# Patient Record
Sex: MALE | Race: WHITE | NOT HISPANIC OR LATINO | Employment: OTHER | URBAN - METROPOLITAN AREA
[De-identification: names, ages, dates, MRNs, and addresses within clinical notes are randomized per-mention and may not be internally consistent; named-entity substitution may affect disease eponyms.]

---

## 2020-09-23 ENCOUNTER — OFFICE VISIT (OUTPATIENT)
Dept: CARDIOTHORACIC SURGERY | Facility: CLINIC | Age: 65
End: 2020-09-23
Payer: MEDICARE

## 2020-09-23 DIAGNOSIS — Z09 FOLLOW-UP EXAMINATION, FOLLOWING OTHER SURGERY: Primary | ICD-10-CM

## 2020-09-23 DIAGNOSIS — I35.0 AORTIC VALVE STENOSIS, ETIOLOGY OF CARDIAC VALVE DISEASE UNSPECIFIED: ICD-10-CM

## 2020-09-23 PROCEDURE — 99024 PR POST-OP FOLLOW-UP VISIT: ICD-10-PCS | Mod: S$GLB,POP,, | Performed by: NURSE PRACTITIONER

## 2020-09-23 PROCEDURE — 99024 POSTOP FOLLOW-UP VISIT: CPT | Mod: S$GLB,POP,, | Performed by: NURSE PRACTITIONER

## 2020-09-24 VITALS — HEIGHT: 70 IN | BODY MASS INDEX: 37.68 KG/M2 | WEIGHT: 263.19 LBS

## 2020-09-24 PROBLEM — I35.0 AS (AORTIC STENOSIS): Status: ACTIVE | Noted: 2020-09-24

## 2020-09-24 RX ORDER — AMIODARONE HYDROCHLORIDE 200 MG/1
200 TABLET ORAL DAILY
COMMUNITY
Start: 2020-09-20

## 2020-09-24 RX ORDER — ASPIRIN 81 MG/1
81 TABLET ORAL DAILY
COMMUNITY
Start: 2020-09-20

## 2020-09-24 RX ORDER — FUROSEMIDE 80 MG/1
80 TABLET ORAL DAILY
COMMUNITY

## 2020-09-24 RX ORDER — TAMSULOSIN HYDROCHLORIDE 0.4 MG/1
1 CAPSULE ORAL DAILY
COMMUNITY
Start: 2020-09-20

## 2020-09-24 RX ORDER — FAMOTIDINE 20 MG/1
20 TABLET, FILM COATED ORAL 2 TIMES DAILY
COMMUNITY
Start: 2020-09-20

## 2020-09-24 RX ORDER — METOPROLOL TARTRATE 50 MG/1
50 TABLET ORAL 2 TIMES DAILY
COMMUNITY
Start: 2020-09-20

## 2020-09-24 NOTE — PROGRESS NOTES
Subjective:    Patient ID:  Josiah Wyatt is a 64 y.o. male who presents for follow up of sp avr surgery due to the presence of severe aortic stenosis; patient states he is doing well but has feelings of weakness and sob; Dr Sharpe recently increased his lasix from 20mg po daily to 80mg po daily; he is here in follow up with a chest xray.        Review of Systems   Constitution: Positive for malaise/fatigue. Negative for chills, decreased appetite, diaphoresis, fever, night sweats, weight gain and weight loss.   Cardiovascular: Positive for dyspnea on exertion and orthopnea. Negative for chest pain, claudication, irregular heartbeat, leg swelling, near-syncope, palpitations, paroxysmal nocturnal dyspnea and syncope.   Respiratory: Positive for shortness of breath. Negative for cough and sleep disturbances due to breathing.    Gastrointestinal: Negative for anorexia, constipation, diarrhea, nausea and vomiting.   Neurological: Negative for dizziness, light-headedness and weakness.        Objective:    Physical Exam   Constitutional: He is oriented to person, place, and time. He appears well-developed and well-nourished.   Weight continues to decrease since he has been at home; he has lost approx 8 pounds since his discharge.   Neck: No JVD present. Carotid bruit is not present. No edema present.   Cardiovascular: Normal rate, regular rhythm and normal heart sounds.   Pulmonary/Chest: Breath sounds normal. No respiratory distress.   Abdominal: Soft.   Musculoskeletal:         General: No edema.   Neurological: He is alert and oriented to person, place, and time.   Skin: Skin is dry.   Sternal incision is without redness or drainage.         Assessment:       1. Follow-up examination, following other surgery         Plan:       Josiah was seen today for valvular heart disease and shortness of breath.    Diagnoses and all orders for this visit:    Follow-up examination, following other surgery        Patient should  continue lasix 80mg as prescribed per dr dillard; will obtain a bmp to evalute bun cr and k; an US of right lower extremity will be obtained to evaluate for a DVT; patient to fu in one week to evaluate sob and weakness.

## 2020-10-28 ENCOUNTER — OFFICE VISIT (OUTPATIENT)
Dept: CARDIOTHORACIC SURGERY | Facility: CLINIC | Age: 65
End: 2020-10-28
Payer: MEDICARE

## 2020-10-28 VITALS
HEIGHT: 69 IN | BODY MASS INDEX: 35.7 KG/M2 | HEART RATE: 103 BPM | DIASTOLIC BLOOD PRESSURE: 81 MMHG | WEIGHT: 241 LBS | SYSTOLIC BLOOD PRESSURE: 135 MMHG

## 2020-10-28 DIAGNOSIS — Z09 FOLLOW-UP EXAMINATION, FOLLOWING OTHER SURGERY: Primary | ICD-10-CM

## 2020-10-28 PROCEDURE — 99024 POSTOP FOLLOW-UP VISIT: CPT | Mod: S$GLB,POP,, | Performed by: NURSE PRACTITIONER

## 2020-10-28 PROCEDURE — 99024 PR POST-OP FOLLOW-UP VISIT: ICD-10-PCS | Mod: S$GLB,POP,, | Performed by: NURSE PRACTITIONER

## 2020-10-28 RX ORDER — FUROSEMIDE 40 MG/1
40 TABLET ORAL DAILY
COMMUNITY

## 2020-10-28 NOTE — PROGRESS NOTES
"Subjective:    Patient ID:  Josiah Wyatt is a 65 y.o. male who presents for follow up of sp avr surgery; he states he has been doing well but reports ongoing pain that begins in his neck and radiates down his left arm; states is associated with shoulder pain also; he denies any chest pain or sob; wife is concerned that patient has "memory issues"; difficulty recognizing where they are at times.        Review of Systems   Constitution: Negative for chills, decreased appetite, diaphoresis, fever, malaise/fatigue, night sweats, weight gain and weight loss.   Eyes: Positive for blurred vision.   Cardiovascular: Negative for chest pain, claudication, dyspnea on exertion, irregular heartbeat, leg swelling, near-syncope, orthopnea, palpitations, paroxysmal nocturnal dyspnea and syncope.   Respiratory: Negative for cough, shortness of breath and sleep disturbances due to breathing.    Musculoskeletal: Positive for neck pain.   Gastrointestinal: Negative for anorexia, constipation, diarrhea, nausea and vomiting.   Neurological: Negative for dizziness, light-headedness and weakness.   Psychiatric/Behavioral: Positive for memory loss.        Objective:    Physical Exam   Constitutional: He is oriented to person, place, and time. He appears well-developed and well-nourished.   Neck: No JVD present. Carotid bruit is not present. No edema present.   Cardiovascular: Normal rate, regular rhythm and normal heart sounds.   Pulmonary/Chest: Breath sounds normal. No respiratory distress.   Abdominal: Soft.   Musculoskeletal:         General: No edema.   Neurological: He is alert and oriented to person, place, and time.   Skin: Skin is dry.   Sternal incision is without redness or drainage         Assessment:       1. Follow-up examination, following other surgery         Plan:       Josiah was seen today for valvular heart disease.    Diagnoses and all orders for this visit:    Follow-up examination, following other surgery        " Chest xray is free of effusion or infiltrate; patient will need a ct of head without contrast due to memory loss/blurred vision; patient will also need a referral to dr carreno in re: neck pain/numbness to left arm; ?spinal stenosis present.

## 2020-11-19 DIAGNOSIS — R41.3 MEMORY LOSS: ICD-10-CM

## 2020-11-19 DIAGNOSIS — H53.8 BLURRED VISION: Primary | ICD-10-CM

## 2020-11-20 DIAGNOSIS — I95.1 ORTHOSTATIC HYPOTENSION: Primary | ICD-10-CM

## 2020-12-02 DIAGNOSIS — R41.3 MEMORY LOSS: ICD-10-CM

## 2020-12-02 DIAGNOSIS — R53.83 FATIGUE, UNSPECIFIED TYPE: ICD-10-CM

## 2020-12-02 DIAGNOSIS — N42.9 PROSTATE DISORDER: ICD-10-CM

## 2020-12-02 DIAGNOSIS — I35.0 AORTIC VALVE STENOSIS, ETIOLOGY OF CARDIAC VALVE DISEASE UNSPECIFIED: Primary | ICD-10-CM

## 2020-12-07 ENCOUNTER — TELEPHONE (OUTPATIENT)
Dept: CARDIOTHORACIC SURGERY | Facility: CLINIC | Age: 65
End: 2020-12-07

## 2020-12-07 NOTE — TELEPHONE ENCOUNTER
Testosterone is within an acceptable range.Pt should see his cardiologist per Ryanne. Pt notified.  
158

## 2021-01-29 ENCOUNTER — TELEPHONE (OUTPATIENT)
Dept: CARDIOTHORACIC SURGERY | Facility: CLINIC | Age: 66
End: 2021-01-29

## 2022-09-19 ENCOUNTER — TELEPHONE (OUTPATIENT)
Dept: FAMILY MEDICINE | Facility: CLINIC | Age: 67
End: 2022-09-19
Payer: MEDICARE

## 2022-09-22 ENCOUNTER — TELEPHONE (OUTPATIENT)
Dept: FAMILY MEDICINE | Facility: CLINIC | Age: 67
End: 2022-09-22
Payer: MEDICARE

## 2022-09-29 ENCOUNTER — TELEPHONE (OUTPATIENT)
Dept: FAMILY MEDICINE | Facility: CLINIC | Age: 67
End: 2022-09-29
Payer: MEDICARE

## 2022-10-06 ENCOUNTER — OFFICE VISIT (OUTPATIENT)
Dept: INFECTIOUS DISEASES | Facility: CLINIC | Age: 67
End: 2022-10-06
Payer: MEDICARE

## 2022-10-06 VITALS — OXYGEN SATURATION: 97 % | HEART RATE: 70 BPM | SYSTOLIC BLOOD PRESSURE: 119 MMHG | DIASTOLIC BLOOD PRESSURE: 72 MMHG

## 2022-10-06 DIAGNOSIS — M46.26 ACUTE OSTEOMYELITIS OF LUMBAR SPINE: Primary | ICD-10-CM

## 2022-10-06 DIAGNOSIS — Z09 HOSPITAL DISCHARGE FOLLOW-UP: ICD-10-CM

## 2022-10-06 DIAGNOSIS — Z79.4 TYPE 2 DIABETES MELLITUS WITHOUT COMPLICATION, WITH LONG-TERM CURRENT USE OF INSULIN: ICD-10-CM

## 2022-10-06 DIAGNOSIS — Z45.2 PICC (PERIPHERALLY INSERTED CENTRAL CATHETER) REMOVAL: ICD-10-CM

## 2022-10-06 DIAGNOSIS — Z79.899 LONG TERM CURRENT USE OF THERAPEUTIC DRUG: ICD-10-CM

## 2022-10-06 DIAGNOSIS — E11.9 TYPE 2 DIABETES MELLITUS WITHOUT COMPLICATION, WITH LONG-TERM CURRENT USE OF INSULIN: ICD-10-CM

## 2022-10-06 PROCEDURE — 99214 PR OFFICE/OUTPT VISIT, EST, LEVL IV, 30-39 MIN: ICD-10-PCS | Mod: S$GLB,,, | Performed by: NURSE PRACTITIONER

## 2022-10-06 PROCEDURE — 99214 OFFICE O/P EST MOD 30 MIN: CPT | Mod: S$GLB,,, | Performed by: NURSE PRACTITIONER

## 2022-10-06 NOTE — PROGRESS NOTES
Infectious Diseases Clinic Note    Subjective:       Patient ID: Josiah Wyatt is a 67 y.o. male     Chief Complaint: Follow-up        A 66-year-old  male with past medical history of HTN, DM2, SALVADOR, Aortic stenosis status post TAVR, CAD status post PCI, discitis in the L4-L5 April 2022 for which he went to [facility] and was on vancomycin for 6 weeks as per his wife, he again developed symptoms and was sent to [facility] where he was started on daptomycin for another 6 weeks and discharged to nursing facility presented to [facility] for abnormal recent MRI follow up was done which was suspicious for osteomyelitis and abscess formation L4-L5 Patient with history of TAVR, had negative SUPA in 4/2022 and blood cultures are negative this time which is reassuring. MRI showed L4-5 osteomyelitis/discitis with enhancing soft tissue in the anterior epidural space posterior to the L4 and L5 vertebral bodies respectively. This is significantly improved when compared to the prior exam. NSGY recommends medical management. Dr Schwartz recommends daptomycin for 8-10 weeks. Patient also had chest wall abscess drained by surgery at bedside. Cultures show MRSA. TTE negative for endocarditis and no refractory bacteremia/fevers.        He was discharged home in stable condition on 9/13/2022 with home health services.  He is accompanied by his wife. Has follow up with Dr. Chadwick next week. Denies wounds.  He has been targeting osteomyelitis/MRSA chest wall abscess with daptomycin. Denies s/e from med. No missed doses. Has PICC line to e. No complications reported. Doing well at this time. Denies fever, chills, NVD.              Past Medical History:   Diagnosis Date    Diabetes mellitus     Sepsis, unspecified organism     Staphylococcus aureus bacteremia     Stenosis of aortic and mitral valves        Social History     Socioeconomic History    Marital status:    Tobacco Use    Smoking status: Never     Smokeless tobacco: Never         Current Outpatient Medications:     amiodarone (PACERONE) 200 MG Tab, Take 200 mg by mouth once daily., Disp: , Rfl:     aspirin (ECOTRIN) 81 MG EC tablet, Take 81 mg by mouth once daily., Disp: , Rfl:     famotidine (PEPCID) 20 MG tablet, Take 20 mg by mouth 2 (two) times daily., Disp: , Rfl:     furosemide (LASIX) 40 MG tablet, Take 40 mg by mouth once daily., Disp: , Rfl:     furosemide (LASIX) 80 MG tablet, Take 80 mg by mouth once daily., Disp: , Rfl:     metoprolol tartrate (LOPRESSOR) 50 MG tablet, Take 50 mg by mouth 2 (two) times daily., Disp: , Rfl:     tamsulosin (FLOMAX) 0.4 mg Cap, Take 1 capsule by mouth once daily., Disp: , Rfl:     Review of Systems   Constitutional:  Negative for appetite change, chills and fever.   Eyes:  Negative for visual disturbance.   Respiratory:  Negative for cough, chest tightness, shortness of breath and wheezing.    Cardiovascular:  Negative for chest pain.   Gastrointestinal:  Negative for abdominal pain, diarrhea, nausea and vomiting.   Endocrine: Negative for polydipsia, polyphagia and polyuria.   Genitourinary:  Negative for difficulty urinating, dysuria, flank pain and hematuria.   Musculoskeletal:  Negative for back pain and neck pain.   Skin:  Negative for rash.   Neurological:  Negative for dizziness, tremors and headaches.   Hematological:  Negative for adenopathy. Does not bruise/bleed easily.   Psychiatric/Behavioral:  Negative for confusion, dysphoric mood, hallucinations, sleep disturbance and suicidal ideas.          Objective:      Vitals:    10/06/22 1507   BP: 119/72   Pulse: 70     Physical Exam  Vitals and nursing note reviewed.   Constitutional:       General: He is awake. He is not in acute distress.     Appearance: Normal appearance. He is well-groomed. He is not ill-appearing, toxic-appearing or diaphoretic.       HENT:      Head: Normocephalic.   Eyes:      Pupils: Pupils are equal, round, and reactive to light.    Cardiovascular:      Rate and Rhythm: Normal rate.   Pulmonary:      Effort: Pulmonary effort is normal.   Abdominal:      General: Abdomen is flat.   Musculoskeletal:         General: Normal range of motion.      Cervical back: Normal range of motion.      Comments: PICC RUE. No s/s infection. NV intact    Skin:     General: Skin is warm and dry.   Neurological:      Mental Status: He is alert and oriented to person, place, and time. Mental status is at baseline.   Psychiatric:         Mood and Affect: Mood normal.         Behavior: Behavior normal. Behavior is cooperative.         Thought Content: Thought content normal.         Judgment: Judgment normal.           Assessment/Plan:       1. Acute osteomyelitis of lumbar spine    2. Long term current use of therapeutic drug    3. Type 2 diabetes mellitus without complication, with long-term current use of insulin    4. PICC (peripherally inserted central catheter) removal    5. Hospital discharge follow-up      Problem List Items Addressed This Visit          ID    Acute osteomyelitis of lumbar spine - Primary    Current Assessment & Plan     Currently with [facility].          1. Staphylococcus aureus                                         DAVION     INTERP    COST                                         ---------  -------  ------         Vancomycin                         1         S       1            Clindamycin                      0.25        S       2            Doxycycline                      <=0.5       S       2            Gentamicin                       <=0.5       S       2            Trimet/Sulfa                     <=10        S       2            Tetracycline                      <=1        S       3            Rifampin                         <=0.5       S       4            Oxacillin                         >=4        R       2            Erythromycin                      >=8        R       3            Inducible Clindamycin Resist                 NEG                   Cefoxitin Screen                            POS            ESR 40    CRP < 0.2    Case discussed w/ Dr. Amandeep Schwartz. Mild jump in ESR, however, significant decline in CRP.     PLAN    1. Continue current daptomycin as planned.   2. Weekly labs  3. RTC in 2 weeks.               Endocrine    Type 2 diabetes mellitus without complication, with long-term current use of insulin    Current Assessment & Plan     A1C unknown.       On basaglar and humalog.           Other Visit Diagnoses       Long term current use of therapeutic drug        PICC (peripherally inserted central catheter) removal        site clean and dry. maanaged by [facility].     Hospital discharge follow-up               No visits with results within 1 Month(s) from this visit.   Latest known visit with results is:   No results found for any previous visit.      No results found in the last 30 days.      Duration of encounter: 30 minutes  This includes face-to-face time and non face-to-face time preparing to see the patient (eg, review of tests), obtaining and/or reviewing separately obtained history, documenting clinical information in the electronic or other health record, independently interpreting resultsand communicating results to the patient/family/caregiver, or care coordination.      All diagnostic data (labs/imaging) was reviewed with the patient and/or family member in the room.  All questions were answered to their liking. The patient and/or family member voiced understanding of all instructions provided. Expectations regarding follow up and treatment plan were voiced and confirmed prior to departure. The patient was given orders/instructions at the end of the visit for reference. They were instructed to notify my office if they have not been contacted for imaging/referrals/labs/results in 1-2 weeks. They voiced understanding of all of the above.     Follow up:     There are no Patient Instructions on file for this  visit.     Follow up in about 2 weeks (around 10/20/2022), or if symptoms worsen or fail to improve.

## 2022-10-06 NOTE — ASSESSMENT & PLAN NOTE
Currently with [facility].          1. Staphylococcus aureus                                         DAVION     INTERP    COST                                         ---------  -------  ------         Vancomycin                         1         S       1            Clindamycin                      0.25        S       2            Doxycycline                      <=0.5       S       2            Gentamicin                       <=0.5       S       2            Trimet/Sulfa                     <=10        S       2            Tetracycline                      <=1        S       3            Rifampin                         <=0.5       S       4            Oxacillin                         >=4        R       2            Erythromycin                      >=8        R       3            Inducible Clindamycin Resist                NEG                   Cefoxitin Screen                            POS            ESR 40    CRP < 0.2    Case discussed w/ Dr. Amandeep Schwartz. Mild jump in ESR, however, significant decline in CRP.     PLAN    1. Continue current daptomycin as planned.   2. Weekly labs  3. RTC in 2 weeks.

## 2022-10-20 ENCOUNTER — OFFICE VISIT (OUTPATIENT)
Dept: INFECTIOUS DISEASES | Facility: CLINIC | Age: 67
End: 2022-10-20
Payer: MEDICARE

## 2022-10-20 VITALS — DIASTOLIC BLOOD PRESSURE: 69 MMHG | OXYGEN SATURATION: 97 % | SYSTOLIC BLOOD PRESSURE: 129 MMHG | HEART RATE: 81 BPM

## 2022-10-20 DIAGNOSIS — Z71.2 ENCOUNTER TO DISCUSS TEST RESULTS: ICD-10-CM

## 2022-10-20 DIAGNOSIS — Z79.899 LONG TERM CURRENT USE OF THERAPEUTIC DRUG: ICD-10-CM

## 2022-10-20 DIAGNOSIS — M54.9 BACK PAIN, UNSPECIFIED BACK LOCATION, UNSPECIFIED BACK PAIN LATERALITY, UNSPECIFIED CHRONICITY: ICD-10-CM

## 2022-10-20 DIAGNOSIS — M46.26 ACUTE OSTEOMYELITIS OF LUMBAR SPINE: Primary | ICD-10-CM

## 2022-10-20 PROCEDURE — 99213 PR OFFICE/OUTPT VISIT, EST, LEVL III, 20-29 MIN: ICD-10-PCS | Mod: S$GLB,,, | Performed by: NURSE PRACTITIONER

## 2022-10-20 PROCEDURE — 99213 OFFICE O/P EST LOW 20 MIN: CPT | Mod: S$GLB,,, | Performed by: NURSE PRACTITIONER

## 2022-10-20 RX ORDER — GABAPENTIN 100 MG/1
CAPSULE ORAL
COMMUNITY
Start: 2022-09-13

## 2022-10-20 RX ORDER — LEVOTHYROXINE SODIUM 100 UG/1
TABLET ORAL
COMMUNITY
Start: 2022-09-20

## 2022-10-20 RX ORDER — DULOXETIN HYDROCHLORIDE 60 MG/1
CAPSULE, DELAYED RELEASE ORAL
COMMUNITY
Start: 2022-09-20

## 2022-10-20 RX ORDER — ROSUVASTATIN CALCIUM 20 MG/1
TABLET, COATED ORAL
COMMUNITY
Start: 2022-09-20

## 2022-10-20 RX ORDER — MEMANTINE HYDROCHLORIDE 5 MG/1
TABLET ORAL
COMMUNITY
Start: 2022-10-14

## 2022-10-20 NOTE — PROGRESS NOTES
Infectious Diseases Clinic Note    Subjective:       Patient ID: Josiah Wyatt is a 67 y.o. male     Chief Complaint: 2WK FU (OSTEO)        A 66-year-old  male with past medical history of HTN, DM2, SALVADOR, Aortic stenosis status post TAVR, CAD status post PCI, discitis in the L4-L5 April 2022 for which he went to [facility] and was on vancomycin for 6 weeks as per his wife, he again developed symptoms and was sent to [facility] where he was started on daptomycin for another 6 weeks and discharged to nursing facility presented to [facility] for abnormal recent MRI follow up was done which was suspicious for osteomyelitis and abscess formation L4-L5 Patient with history of TAVR, had negative SUPA in 4/2022 and blood cultures are negative this time which is reassuring. MRI showed L4-5 osteomyelitis/discitis with enhancing soft tissue in the anterior epidural space posterior to the L4 and L5 vertebral bodies respectively. This is significantly improved when compared to the prior exam. NSGY recommends medical management. Dr Schwartz recommends daptomycin for 8-10 weeks. Patient also had chest wall abscess drained by surgery at bedside. Cultures show MRSA. TTE negative for endocarditis and no refractory bacteremia/fevers.        He was discharged home in stable condition on 9/13/2022 with home health services.          Here for TWO week follow up.  He is accompanied by his wife.  Denies wounds.  He has been targeting osteomyelitis/MRSA chest wall abscess with daptomycin. Denies s/e from med. No missed doses. Has PICC line to e. No complications reported. Doing well at this time. Denies fever, chills, NVD.       He saw Dr. Chadwick last week.  Apparently there was some discussion about repeating images after antibiotics are concluded.              Past Medical History:   Diagnosis Date    Diabetes mellitus     Sepsis, unspecified organism     Staphylococcus aureus bacteremia     Stenosis of aortic and  mitral valves        Social History     Socioeconomic History    Marital status:    Tobacco Use    Smoking status: Never    Smokeless tobacco: Never         Current Outpatient Medications:     amiodarone (PACERONE) 200 MG Tab, Take 200 mg by mouth once daily., Disp: , Rfl:     aspirin (ECOTRIN) 81 MG EC tablet, Take 81 mg by mouth once daily., Disp: , Rfl:     DULoxetine (CYMBALTA) 60 MG capsule, , Disp: , Rfl:     empagliflozin (JARDIANCE) 25 mg tablet, , Disp: , Rfl:     famotidine (PEPCID) 20 MG tablet, Take 20 mg by mouth 2 (two) times daily., Disp: , Rfl:     furosemide (LASIX) 40 MG tablet, Take 40 mg by mouth once daily., Disp: , Rfl:     furosemide (LASIX) 80 MG tablet, Take 80 mg by mouth once daily., Disp: , Rfl:     gabapentin (NEURONTIN) 100 MG capsule, , Disp: , Rfl:     levothyroxine (SYNTHROID) 100 MCG tablet, , Disp: , Rfl:     memantine (NAMENDA) 5 MG Tab, , Disp: , Rfl:     metoprolol tartrate (LOPRESSOR) 50 MG tablet, Take 50 mg by mouth 2 (two) times daily., Disp: , Rfl:     rosuvastatin (CRESTOR) 20 MG tablet, , Disp: , Rfl:     tamsulosin (FLOMAX) 0.4 mg Cap, Take 1 capsule by mouth once daily., Disp: , Rfl:     Review of Systems   Constitutional:  Negative for appetite change, chills and fever.   Eyes:  Negative for visual disturbance.   Respiratory:  Negative for cough, chest tightness, shortness of breath and wheezing.    Cardiovascular:  Negative for chest pain.   Gastrointestinal:  Negative for abdominal pain, diarrhea, nausea and vomiting.   Endocrine: Negative for polydipsia, polyphagia and polyuria.   Genitourinary:  Negative for difficulty urinating, dysuria, flank pain and hematuria.   Musculoskeletal:  Negative for back pain and neck pain.   Skin:  Negative for rash.   Neurological:  Negative for dizziness, tremors and headaches.   Hematological:  Negative for adenopathy. Does not bruise/bleed easily.   Psychiatric/Behavioral:  Negative for confusion, dysphoric mood,  hallucinations, sleep disturbance and suicidal ideas.          Objective:      Vitals:    10/20/22 1352   BP: 129/69   Pulse: 81     Physical Exam  Vitals and nursing note reviewed.   Constitutional:       General: He is awake. He is not in acute distress.     Appearance: Normal appearance. He is well-groomed. He is not ill-appearing, toxic-appearing or diaphoretic.       HENT:      Head: Normocephalic.   Eyes:      Pupils: Pupils are equal, round, and reactive to light.   Cardiovascular:      Rate and Rhythm: Normal rate.   Pulmonary:      Effort: Pulmonary effort is normal.   Abdominal:      General: Abdomen is flat.   Musculoskeletal:         General: Normal range of motion.      Cervical back: Normal range of motion.      Comments: PICC RUE. No s/s infection. NV intact    Skin:     General: Skin is warm and dry.   Neurological:      Mental Status: He is alert and oriented to person, place, and time. Mental status is at baseline.   Psychiatric:         Mood and Affect: Mood normal.         Behavior: Behavior normal. Behavior is cooperative.         Thought Content: Thought content normal.         Judgment: Judgment normal.           Assessment/Plan:       1. Acute osteomyelitis of lumbar spine    2. Encounter to discuss test results    3. Back pain, unspecified back location, unspecified back pain laterality, unspecified chronicity    4. Long term current use of therapeutic drug      Problem List Items Addressed This Visit          ID    Acute osteomyelitis of lumbar spine - Primary    Current Assessment & Plan      1. Staphylococcus aureus                                         DAVION     INTERP    COST                                         ---------  -------  ------         Vancomycin                         1         S       1            Clindamycin                      0.25        S       2            Doxycycline                      <=0.5       S       2            Gentamicin                       <=0.5        S       2            Trimet/Sulfa                     <=10        S       2            Tetracycline                      <=1        S       3            Rifampin                         <=0.5       S       4            Oxacillin                         >=4        R       2            Erythromycin                      >=8        R       3            Inducible Clindamycin Resist                NEG                   Cefoxitin Screen                            POS            Labs are improving.       ESR 34       PLAN     1. Continue current daptomycin as planned. expect another 3-4 weeks minimum.   2. Weekly labs  3. RTC in 2 weeks.           Other Visit Diagnoses       Encounter to discuss test results        Back pain, unspecified back location, unspecified back pain laterality, unspecified chronicity        Recommend discussing increasing gabapentin and muscle relaxer with PCP.     Long term current use of therapeutic drug               No visits with results within 1 Month(s) from this visit.   Latest known visit with results is:   No results found for any previous visit.      No results found in the last 30 days.      Duration of encounter: 20 minutes  This includes face-to-face time and non face-to-face time preparing to see the patient (eg, review of tests), obtaining and/or reviewing separately obtained history, documenting clinical information in the electronic or other health record, independently interpreting resultsand communicating results to the patient/family/caregiver, or care coordination.      All diagnostic data (labs/imaging) was reviewed with the patient and/or family member in the room.  All questions were answered to their liking. The patient and/or family member voiced understanding of all instructions provided. Expectations regarding follow up and treatment plan were voiced and confirmed prior to departure. The patient was given orders/instructions at the end of the visit for reference. They  were instructed to notify my office if they have not been contacted for imaging/referrals/labs/results in 1-2 weeks. They voiced understanding of all of the above.     Follow up:     There are no Patient Instructions on file for this visit.     Follow up in about 2 weeks (around 11/3/2022), or if symptoms worsen or fail to improve.

## 2022-10-20 NOTE — ASSESSMENT & PLAN NOTE
1. Staphylococcus aureus                                         DAVION     INTERP    COST                                         ---------  -------  ------         Vancomycin                         1         S       1            Clindamycin                      0.25        S       2            Doxycycline                      <=0.5       S       2            Gentamicin                       <=0.5       S       2            Trimet/Sulfa                     <=10        S       2            Tetracycline                      <=1        S       3            Rifampin                         <=0.5       S       4            Oxacillin                         >=4        R       2            Erythromycin                      >=8        R       3            Inducible Clindamycin Resist                NEG                   Cefoxitin Screen                            POS            10/18/22   ESR 34      Labs are improving.     10/24/2022  ESR 20       PLAN     1. Continue current daptomycin as planned. expect another 3-4 weeks minimum.   2. Weekly labs  3. RTC in 2 weeks.

## 2022-10-23 ENCOUNTER — DOCUMENT SCAN (OUTPATIENT)
Dept: HOME HEALTH SERVICES | Facility: HOSPITAL | Age: 67
End: 2022-10-23
Payer: MEDICARE

## 2022-10-31 ENCOUNTER — TELEPHONE (OUTPATIENT)
Dept: FAMILY MEDICINE | Facility: CLINIC | Age: 67
End: 2022-10-31
Payer: MEDICARE

## 2022-10-31 DIAGNOSIS — M46.26 ACUTE OSTEOMYELITIS OF LUMBAR SPINE: Primary | ICD-10-CM

## 2022-10-31 RX ORDER — DAPTOMYCIN 50 MG/ML
6 INJECTION, POWDER, LYOPHILIZED, FOR SOLUTION INTRAVENOUS DAILY
Qty: 36.68 EACH | Refills: 0 | Status: SHIPPED | OUTPATIENT
Start: 2022-10-31 | End: 2022-11-28

## 2022-10-31 NOTE — TELEPHONE ENCOUNTER
Per nam w/ [facility] infusion he's on dapto 600 q24 w/ 10/28 stop date originally. They will continue x28 days along w/ wkly labs.

## 2022-10-31 NOTE — TELEPHONE ENCOUNTER
----- Message from Shane Darden NP sent at 10/31/2022  9:35 AM CDT -----  Contact: Mrs Wyatt(wife)  Just tell them to match the current dose for another 28 days please....         ----- Message -----  From: Pennie Mejia MA  Sent: 10/31/2022   8:45 AM CDT  To: Shane Darden NP      ----- Message -----  From: Charmaine Cabello  Sent: 10/31/2022   8:39 AM CDT  To: [facility] Staff    Mrs Wyatt called to consult with nurse or staff regarding the patients antibiotics. She states he is out and is not sure if he needing to get a new prescription or wait until he sees the provider. She would like a call back and can be reached at [phone]. She request a voicemail if case she doesn't answer. Thanks/MR

## 2022-11-02 DIAGNOSIS — M46.26 ACUTE OSTEOMYELITIS OF LUMBAR SPINE: Primary | ICD-10-CM

## 2022-11-03 ENCOUNTER — OFFICE VISIT (OUTPATIENT)
Dept: INFECTIOUS DISEASES | Facility: CLINIC | Age: 67
End: 2022-11-03
Payer: MEDICARE

## 2022-11-03 VITALS
HEART RATE: 64 BPM | OXYGEN SATURATION: 98 % | DIASTOLIC BLOOD PRESSURE: 78 MMHG | TEMPERATURE: 98 F | SYSTOLIC BLOOD PRESSURE: 132 MMHG

## 2022-11-03 DIAGNOSIS — M54.9 BACK PAIN, UNSPECIFIED BACK LOCATION, UNSPECIFIED BACK PAIN LATERALITY, UNSPECIFIED CHRONICITY: ICD-10-CM

## 2022-11-03 DIAGNOSIS — M46.26 ACUTE OSTEOMYELITIS OF LUMBAR SPINE: Primary | ICD-10-CM

## 2022-11-03 PROCEDURE — 99213 PR OFFICE/OUTPT VISIT, EST, LEVL III, 20-29 MIN: ICD-10-PCS | Mod: S$GLB,,, | Performed by: NURSE PRACTITIONER

## 2022-11-03 PROCEDURE — 99213 OFFICE O/P EST LOW 20 MIN: CPT | Mod: S$GLB,,, | Performed by: NURSE PRACTITIONER

## 2022-11-03 RX ORDER — LEVOFLOXACIN 750 MG/1
750 TABLET ORAL DAILY
COMMUNITY
Start: 2022-11-01

## 2022-11-03 RX ORDER — CLOPIDOGREL BISULFATE 75 MG/1
75 TABLET ORAL DAILY
COMMUNITY
Start: 2022-09-20

## 2022-11-03 RX ORDER — TRAMADOL HYDROCHLORIDE 50 MG/1
50 TABLET ORAL EVERY 4 HOURS PRN
Qty: 30 TABLET | Refills: 0 | Status: SHIPPED | OUTPATIENT
Start: 2022-11-03

## 2022-11-03 RX ORDER — INSULIN GLARGINE 100 [IU]/ML
24 INJECTION, SOLUTION SUBCUTANEOUS DAILY
COMMUNITY
Start: 2022-09-22

## 2022-11-03 RX ORDER — PEN NEEDLE, DIABETIC 32GX 5/32"
NEEDLE, DISPOSABLE MISCELLANEOUS
COMMUNITY
Start: 2022-09-22

## 2022-11-03 RX ORDER — RISPERIDONE 0.5 MG/1
0.5 TABLET ORAL 2 TIMES DAILY
COMMUNITY
Start: 2022-10-14

## 2022-11-03 RX ORDER — EZETIMIBE 10 MG/1
10 TABLET ORAL DAILY
COMMUNITY
Start: 2022-10-20

## 2022-11-03 RX ORDER — MUPIROCIN 20 MG/G
22 OINTMENT TOPICAL DAILY
COMMUNITY
Start: 2022-08-26

## 2022-11-03 RX ORDER — INSULIN LISPRO 100 [IU]/ML
INJECTION, SOLUTION INTRAVENOUS; SUBCUTANEOUS
COMMUNITY
Start: 2022-08-13

## 2022-11-03 NOTE — PROGRESS NOTES
Infectious Diseases Clinic Note    Subjective:       Patient ID: Josiah Wyatt is a 67 y.o. male     Chief Complaint: No chief complaint on file.        A 66-year-old  male with past medical history of HTN, DM2, SALVADOR, Aortic stenosis status post TAVR, CAD status post PCI, discitis in the L4-L5 April 2022 for which he went to [facility] and was on vancomycin for 6 weeks as per his wife, he again developed symptoms and was sent to [facility] where he was started on daptomycin for another 6 weeks and discharged to nursing facility presented to [facility] for abnormal recent MRI follow up was done which was suspicious for osteomyelitis and abscess formation L4-L5 Patient with history of TAVR, had negative SUPA in 4/2022 and blood cultures are negative this time which is reassuring. MRI showed L4-5 osteomyelitis/discitis with enhancing soft tissue in the anterior epidural space posterior to the L4 and L5 vertebral bodies respectively. This is significantly improved when compared to the prior exam. NSGY recommends medical management. Dr Schwartz recommends daptomycin for 8-10 weeks. Patient also had chest wall abscess drained by surgery at bedside. Cultures show MRSA. TTE negative for endocarditis and no refractory bacteremia/fevers.        He was discharged home in stable condition on 9/13/2022 with home health services.          Here for TWO week follow up.  He is accompanied by his wife.  Denies wounds.  He has been targeting osteomyelitis/MRSA chest wall abscess with daptomycin. Denies s/e from med. No missed doses. Has PICC line to e. No complications reported. Doing well at this time. Denies fever, chills, NVD.       He saw Dr. Chadwick last week.  Apparently there was some discussion about repeating images after antibiotics are concluded.              Past Medical History:   Diagnosis Date    Diabetes mellitus     Sepsis, unspecified organism     Staphylococcus aureus bacteremia     Stenosis of  "aortic and mitral valves        Social History     Socioeconomic History    Marital status:    Tobacco Use    Smoking status: Never    Smokeless tobacco: Never         Current Outpatient Medications:     amiodarone (PACERONE) 200 MG Tab, Take 200 mg by mouth once daily., Disp: , Rfl:     aspirin (ECOTRIN) 81 MG EC tablet, Take 81 mg by mouth once daily., Disp: , Rfl:     BASAGLAR KWIKPEN U-100 INSULIN glargine 100 units/mL SubQ pen, Inject 24 Units into the skin once daily., Disp: , Rfl:     BD ANGEL 2ND GEN PEN NEEDLE 32 gauge x 5/32" Ndle, , Disp: , Rfl:     clopidogreL (PLAVIX) 75 mg tablet, Take 75 mg by mouth once daily., Disp: , Rfl:     DULoxetine (CYMBALTA) 60 MG capsule, , Disp: , Rfl:     empagliflozin (JARDIANCE) 25 mg tablet, , Disp: , Rfl:     ezetimibe (ZETIA) 10 mg tablet, Take 10 mg by mouth once daily., Disp: , Rfl:     famotidine (PEPCID) 20 MG tablet, Take 20 mg by mouth 2 (two) times daily., Disp: , Rfl:     furosemide (LASIX) 40 MG tablet, Take 40 mg by mouth once daily., Disp: , Rfl:     furosemide (LASIX) 80 MG tablet, Take 80 mg by mouth once daily., Disp: , Rfl:     gabapentin (NEURONTIN) 100 MG capsule, , Disp: , Rfl:     insulin lispro 100 unit/mL injection, INJECT PER SLIDING SCALE SUBCUTANEOUSLY NO MORE THAN 30 UNITS DAILY, Disp: , Rfl:     levoFLOXacin (LEVAQUIN) 750 MG tablet, Take 750 mg by mouth once daily., Disp: , Rfl:     levothyroxine (SYNTHROID) 100 MCG tablet, , Disp: , Rfl:     memantine (NAMENDA) 5 MG Tab, , Disp: , Rfl:     metoprolol tartrate (LOPRESSOR) 50 MG tablet, Take 50 mg by mouth 2 (two) times daily., Disp: , Rfl:     mupirocin (BACTROBAN) 2 % ointment, Apply 22 g topically once daily., Disp: , Rfl:     risperiDONE (RISPERDAL) 0.5 MG Tab, Take 0.5 mg by mouth 2 (two) times daily., Disp: , Rfl:     rosuvastatin (CRESTOR) 20 MG tablet, , Disp: , Rfl:     tamsulosin (FLOMAX) 0.4 mg Cap, Take 1 capsule by mouth once daily., Disp: , Rfl:     DAPTOmycin (CUBICIN) " 500 mg injection, Inject 655 mg into the vein Daily., Disp: 36.68 each, Rfl: 0    traMADoL (ULTRAM) 50 mg tablet, Take 1 tablet (50 mg total) by mouth every 4 (four) hours as needed for Pain., Disp: 30 tablet, Rfl: 0    Review of Systems   Constitutional:  Negative for appetite change, chills and fever.   Eyes:  Negative for visual disturbance.   Respiratory:  Negative for cough, chest tightness, shortness of breath and wheezing.    Cardiovascular:  Negative for chest pain.   Gastrointestinal:  Negative for abdominal pain, diarrhea, nausea and vomiting.   Endocrine: Negative for polydipsia, polyphagia and polyuria.   Genitourinary:  Negative for decreased urine volume, difficulty urinating, dysuria, flank pain, hematuria and urgency.   Musculoskeletal:  Positive for back pain. Negative for neck pain.   Skin:  Negative for rash.   Neurological:  Negative for dizziness, tremors and headaches.   Hematological:  Negative for adenopathy. Does not bruise/bleed easily.   Psychiatric/Behavioral:  Negative for confusion, dysphoric mood, hallucinations, sleep disturbance and suicidal ideas.          Objective:      Vitals:    11/03/22 1337   BP: 132/78   Pulse: 64   Temp: 98 °F (36.7 °C)     Physical Exam  Vitals and nursing note reviewed.   Constitutional:       General: He is awake. He is not in acute distress.     Appearance: Normal appearance. He is well-groomed. He is not ill-appearing, toxic-appearing or diaphoretic.       HENT:      Head: Normocephalic.   Eyes:      Pupils: Pupils are equal, round, and reactive to light.   Cardiovascular:      Rate and Rhythm: Normal rate.   Pulmonary:      Effort: Pulmonary effort is normal.   Abdominal:      General: Abdomen is flat.   Musculoskeletal:         General: Normal range of motion.      Cervical back: Normal range of motion.      Comments: PICC RUE. No s/s infection. NV intact    Skin:     General: Skin is warm and dry.   Neurological:      Mental Status: He is alert and  oriented to person, place, and time. Mental status is at baseline.   Psychiatric:         Mood and Affect: Mood normal.         Behavior: Behavior normal. Behavior is cooperative.         Thought Content: Thought content normal.         Judgment: Judgment normal.           Assessment/Plan:       1. Acute osteomyelitis of lumbar spine    2. Back pain, unspecified back location, unspecified back pain laterality, unspecified chronicity      Problem List Items Addressed This Visit          ID    Acute osteomyelitis of lumbar spine - Primary    Current Assessment & Plan       1. Staphylococcus aureus                                         DAVION     INTERP    COST                                         ---------  -------  ------         Vancomycin                         1         S       1            Clindamycin                      0.25        S       2            Doxycycline                      <=0.5       S       2            Gentamicin                       <=0.5       S       2            Trimet/Sulfa                     <=10        S       2            Tetracycline                      <=1        S       3            Rifampin                         <=0.5       S       4            Oxacillin                         >=4        R       2            Erythromycin                      >=8        R       3            Inducible Clindamycin Resist                NEG                   Cefoxitin Screen                            POS              Labs are improving.         10/31/2022  ESR 34        PLAN     1. Continue current daptomycin as planned.   2. Weekly labs  3. RTC in 2 weeks.          Relevant Medications    traMADoL (ULTRAM) 50 mg tablet     Other Visit Diagnoses       Back pain, unspecified back location, unspecified back pain laterality, unspecified chronicity        Will give him ultram. Recommend he f/u with PCP or neurosurgeon for pain mgmt.            No visits with results within 1 Month(s) from this  visit.   Latest known visit with results is:   No results found for any previous visit.      No results found in the last 30 days.      Duration of encounter: 20 minutes  This includes face-to-face time and non face-to-face time preparing to see the patient (eg, review of tests), obtaining and/or reviewing separately obtained history, documenting clinical information in the electronic or other health record, independently interpreting resultsand communicating results to the patient/family/caregiver, or care coordination.      All diagnostic data (labs/imaging) was reviewed with the patient and/or family member in the room.  All questions were answered to their liking. The patient and/or family member voiced understanding of all instructions provided. Expectations regarding follow up and treatment plan were voiced and confirmed prior to departure. The patient was given orders/instructions at the end of the visit for reference. They were instructed to notify my office if they have not been contacted for imaging/referrals/labs/results in 1-2 weeks. They voiced understanding of all of the above.     Follow up:     There are no Patient Instructions on file for this visit.     Follow up in about 2 weeks (around 11/17/2022), or if symptoms worsen or fail to improve.

## 2022-11-03 NOTE — ASSESSMENT & PLAN NOTE
1. Staphylococcus aureus                                         DAVION     INTERP    COST                                         ---------  -------  ------         Vancomycin                         1         S       1            Clindamycin                      0.25        S       2            Doxycycline                      <=0.5       S       2            Gentamicin                       <=0.5       S       2            Trimet/Sulfa                     <=10        S       2            Tetracycline                      <=1        S       3            Rifampin                         <=0.5       S       4            Oxacillin                         >=4        R       2            Erythromycin                      >=8        R       3            Inducible Clindamycin Resist                NEG                   Cefoxitin Screen                            POS              Labs are improving.         10/31/2022  ESR 34        PLAN     1. Continue current daptomycin as planned.   2. Weekly labs  3. RTC in 2 weeks.

## 2022-11-11 ENCOUNTER — TELEPHONE (OUTPATIENT)
Dept: FAMILY MEDICINE | Facility: CLINIC | Age: 67
End: 2022-11-11
Payer: MEDICARE

## 2022-11-14 ENCOUNTER — PATIENT MESSAGE (OUTPATIENT)
Dept: FAMILY MEDICINE | Facility: CLINIC | Age: 67
End: 2022-11-14
Payer: MEDICARE

## 2022-11-15 ENCOUNTER — TELEPHONE (OUTPATIENT)
Dept: FAMILY MEDICINE | Facility: CLINIC | Age: 67
End: 2022-11-15
Payer: MEDICARE

## 2022-11-15 DIAGNOSIS — T82.524A DISPLACEMENT OF PERIPHERALLY INSERTED CENTRAL CATHETER (PICC): Primary | ICD-10-CM

## 2022-11-17 ENCOUNTER — OFFICE VISIT (OUTPATIENT)
Dept: INFECTIOUS DISEASES | Facility: CLINIC | Age: 67
End: 2022-11-17
Payer: MEDICARE

## 2022-11-17 VITALS
DIASTOLIC BLOOD PRESSURE: 59 MMHG | TEMPERATURE: 98 F | HEART RATE: 70 BPM | OXYGEN SATURATION: 100 % | SYSTOLIC BLOOD PRESSURE: 130 MMHG

## 2022-11-17 DIAGNOSIS — T82.524A DISPLACEMENT OF PERIPHERALLY INSERTED CENTRAL CATHETER (PICC): ICD-10-CM

## 2022-11-17 DIAGNOSIS — M46.26 ACUTE OSTEOMYELITIS OF LUMBAR SPINE: Primary | ICD-10-CM

## 2022-11-17 PROCEDURE — 99214 OFFICE O/P EST MOD 30 MIN: CPT | Mod: S$GLB,,, | Performed by: NURSE PRACTITIONER

## 2022-11-17 PROCEDURE — 99214 PR OFFICE/OUTPT VISIT, EST, LEVL IV, 30-39 MIN: ICD-10-PCS | Mod: S$GLB,,, | Performed by: NURSE PRACTITIONER

## 2022-11-17 NOTE — PROGRESS NOTES
Infectious Diseases Clinic Note    Subjective:       Patient ID: Josiah Wyatt is a 67 y.o. male     Chief Complaint: Follow-up (2 week )        A 66-year-old  male with past medical history of HTN, DM2, SALVADOR, Aortic stenosis status post TAVR, CAD status post PCI, discitis in the L4-L5 April 2022 for which he went to [facility] and was on vancomycin for 6 weeks as per his wife, he again developed symptoms and was sent to [facility] where he was started on daptomycin for another 6 weeks and discharged to nursing facility presented to [facility] for abnormal recent MRI follow up was done which was suspicious for osteomyelitis and abscess formation L4-L5 Patient with history of TAVR, had negative SUPA in 4/2022 and blood cultures are negative this time which is reassuring. MRI showed L4-5 osteomyelitis/discitis with enhancing soft tissue in the anterior epidural space posterior to the L4 and L5 vertebral bodies respectively. This is significantly improved when compared to the prior exam. NSGY recommends medical management. Dr Schwartz recommends daptomycin for 8-10 weeks. Patient also had chest wall abscess drained by surgery at bedside. Cultures show MRSA. TTE negative for endocarditis and no refractory bacteremia/fevers.        He was discharged home in stable condition on 9/13/2022 with home health services.          Here for TWO week follow up.  He is accompanied by his wife.  Denies wounds.  He has been targeting osteomyelitis/MRSA chest wall abscess with daptomycin. Denies s/e from med. No missed doses. Has PICC line to e. PICC line has been pulled out slightly.  Orders were sent for exchange earlier this week but the patient deferred until he came in today.  Nocomplications reported otherwise. Med is still flushing without complications. Doing well at this time. Denies fever, chills, NVD. EOT estimated to be 11/22/2022.       He saw Dr. Chadwick last week.  MRI was done. Dr. Chadwick wants him  "to proceed w/ PT. They are not sure about infection, but there was no mention of this during the visit.     No new complaints at this time.              Past Medical History:   Diagnosis Date    Diabetes mellitus     Sepsis, unspecified organism     Staphylococcus aureus bacteremia     Stenosis of aortic and mitral valves        Social History     Socioeconomic History    Marital status:    Tobacco Use    Smoking status: Never    Smokeless tobacco: Never         Current Outpatient Medications:     amiodarone (PACERONE) 200 MG Tab, Take 200 mg by mouth once daily., Disp: , Rfl:     aspirin (ECOTRIN) 81 MG EC tablet, Take 81 mg by mouth once daily., Disp: , Rfl:     BASAGLAR KWIKPEN U-100 INSULIN glargine 100 units/mL SubQ pen, Inject 24 Units into the skin once daily., Disp: , Rfl:     BD ANGEL 2ND GEN PEN NEEDLE 32 gauge x 5/32" Ndle, , Disp: , Rfl:     clopidogreL (PLAVIX) 75 mg tablet, Take 75 mg by mouth once daily., Disp: , Rfl:     DAPTOmycin (CUBICIN) 500 mg injection, Inject 655 mg into the vein Daily., Disp: 36.68 each, Rfl: 0    DULoxetine (CYMBALTA) 60 MG capsule, , Disp: , Rfl:     empagliflozin (JARDIANCE) 25 mg tablet, , Disp: , Rfl:     ezetimibe (ZETIA) 10 mg tablet, Take 10 mg by mouth once daily., Disp: , Rfl:     famotidine (PEPCID) 20 MG tablet, Take 20 mg by mouth 2 (two) times daily., Disp: , Rfl:     furosemide (LASIX) 40 MG tablet, Take 40 mg by mouth once daily., Disp: , Rfl:     furosemide (LASIX) 80 MG tablet, Take 80 mg by mouth once daily., Disp: , Rfl:     gabapentin (NEURONTIN) 100 MG capsule, , Disp: , Rfl:     insulin lispro 100 unit/mL injection, INJECT PER SLIDING SCALE SUBCUTANEOUSLY NO MORE THAN 30 UNITS DAILY, Disp: , Rfl:     levoFLOXacin (LEVAQUIN) 750 MG tablet, Take 750 mg by mouth once daily., Disp: , Rfl:     levothyroxine (SYNTHROID) 100 MCG tablet, , Disp: , Rfl:     memantine (NAMENDA) 5 MG Tab, , Disp: , Rfl:     metoprolol tartrate (LOPRESSOR) 50 MG tablet, Take " 50 mg by mouth 2 (two) times daily., Disp: , Rfl:     mupirocin (BACTROBAN) 2 % ointment, Apply 22 g topically once daily., Disp: , Rfl:     risperiDONE (RISPERDAL) 0.5 MG Tab, Take 0.5 mg by mouth 2 (two) times daily., Disp: , Rfl:     rosuvastatin (CRESTOR) 20 MG tablet, , Disp: , Rfl:     tamsulosin (FLOMAX) 0.4 mg Cap, Take 1 capsule by mouth once daily., Disp: , Rfl:     traMADoL (ULTRAM) 50 mg tablet, Take 1 tablet (50 mg total) by mouth every 4 (four) hours as needed for Pain., Disp: 30 tablet, Rfl: 0    Review of Systems   Constitutional:  Negative for appetite change, chills and fever.   Eyes:  Negative for visual disturbance.   Respiratory:  Negative for cough, chest tightness, shortness of breath and wheezing.    Cardiovascular:  Negative for chest pain.   Gastrointestinal:  Negative for abdominal pain, diarrhea, nausea and vomiting.   Endocrine: Negative for polydipsia, polyphagia and polyuria.   Genitourinary:  Negative for decreased urine volume, difficulty urinating, dysuria, flank pain, hematuria and urgency.   Musculoskeletal:  Positive for back pain. Negative for neck pain.   Skin:  Negative for rash.   Neurological:  Negative for dizziness, tremors and headaches.   Hematological:  Negative for adenopathy. Does not bruise/bleed easily.   Psychiatric/Behavioral:  Negative for confusion, dysphoric mood, hallucinations, sleep disturbance and suicidal ideas.          Objective:      Vitals:    11/17/22 1324   BP: (!) 130/59   Pulse: 70   Temp: 97.9 °F (36.6 °C)     Physical Exam  Vitals and nursing note reviewed.   Constitutional:       General: He is awake. He is not in acute distress.     Appearance: Normal appearance. He is well-groomed. He is not ill-appearing, toxic-appearing or diaphoretic.       HENT:      Head: Normocephalic.   Eyes:      Pupils: Pupils are equal, round, and reactive to light.   Cardiovascular:      Rate and Rhythm: Normal rate.   Pulmonary:      Effort: Pulmonary effort is  normal.   Abdominal:      General: Abdomen is flat.   Musculoskeletal:         General: Normal range of motion.      Cervical back: Normal range of motion.      Comments: PICC RUE. No s/s infection. NV intact.  Picc line @ 7.5 cm line.    Skin:     General: Skin is warm and dry.   Neurological:      Mental Status: He is alert and oriented to person, place, and time. Mental status is at baseline.   Psychiatric:         Mood and Affect: Mood normal.         Behavior: Behavior normal. Behavior is cooperative.         Thought Content: Thought content normal.         Judgment: Judgment normal.           Assessment/Plan:       1. Acute osteomyelitis of lumbar spine    2. Displacement of peripherally inserted central catheter (PICC)      Problem List Items Addressed This Visit          ID    Acute osteomyelitis of lumbar spine - Primary    Current Assessment & Plan           1. Staphylococcus aureus                                         DAVION     INTERP    COST                                         ---------  -------  ------         Vancomycin                         1         S       1            Clindamycin                      0.25        S       2            Doxycycline                      <=0.5       S       2            Gentamicin                       <=0.5       S       2            Trimet/Sulfa                     <=10        S       2            Tetracycline                      <=1        S       3            Rifampin                         <=0.5       S       4            Oxacillin                         >=4        R       2            Erythromycin                      >=8        R       3            Inducible Clindamycin Resist                NEG                   Cefoxitin Screen                            POS                    10/31/2022  ESR 34    11/7  ESR 27  CRP 0.03    11/15  ESR 37   CRP 1.51           The patient is doing well from ID standpoint. ESR and CRP are fluctuating, however, the patient  could have an underlying autoimmune disorder that is undiagnosed.  I reached out to Dr. Chadwick's office, but was unable to speak to him directly regarding MRI. The patient is scheduled to undergo PT. Call placed to expedite PICC line exchange. No complications seen, however, the line is out further than I would like it to be at this point.       PLAN     1. Continue current daptomycin as planned. EOT is 11/22/2022, which is roughly 10 weeks from hospital discharge. Plan to pull picc line at that time. Case discussed w/ Dr. Amandeep Schwartz.   2. Weekly labs  3. MRI requested from Dr. Chadwick's office. Doubtful this will be of any clinical value given how early in therapy the patient is.   4. Medical record review showed sternotomy wires w/ previous chest wall abscess while in hospital. Given the patient's history of recurrent cellulitis, we would recommend chronic suppression given the proximity of the wires to the chest wall cellulitis previously.  Plan to start Doxy 100mg bid at next visit.   5. UPDATE = spoke to Dr. Chadwick. He does not feel the MRI is accurate and feels the infection is improving from his independent review of the film.             Patient had a klebsiella UTI dating 10/25/2022 - call placed to Mario Bobby NP.  Pt was prescribed Levaquin 750 mg daily x 5 days on 11/1/22.        Culture Urine  Final                                                                SWP       Organism 1                     K pneumoniae ssp pneumoniae          Resistance Mechanism:       Extended Spectrum Beta Lactamase                                      50,000 - 75,000 CFU/mL                                      Sensitivity completed.         1. K pneumoniae ssp pneumoniae                                         DAVION     INTERP    COST                                         ---------  -------  ------         Tobramycin                        <=1        S       1            Ciprofloxacin                      1          S       2            Gentamicin                        <=1        S       2            Levofloxacin                       2         S       2            Trimet/Sulfa                     <=20        S       2            Meropenem                       <=0.25       S       3            Cefoxitin                          8         S                    Ampicillin                       >=32        R       1            Ampicillin/Sulbactam             >=32        R       1            Cefazolin                        >=64        R       1            Cefepime                         >=32        R       1            Ceftriaxone                      >=64        R       1            Nitrofurantoin                    256        R       1            Piperacillin/Tazobactam          >=128       R       2            Cefotaxime                       >=64        R       4              MULTI-DRUG RESISTANT ORGANISM       FAXED TO [facility]       Precautions Recommended? ENHANCED CONTACT                  Relevant Orders    Outpatient PICC Insertion     Other Visit Diagnoses       Displacement of peripherally inserted central catheter (PICC)        Recommend exchanging PICC line. Order sent to [facility].     Relevant Orders    Outpatient PICC Insertion           No visits with results within 1 Month(s) from this visit.   Latest known visit with results is:   No results found for any previous visit.      No results found in the last 30 days.      Duration of encounter: 40 minutes  This includes face-to-face time and non face-to-face time preparing to see the patient (eg, review of tests), obtaining and/or reviewing separately obtained history, documenting clinical information in the electronic or other health record, independently interpreting resultsand communicating results to the patient/family/caregiver, or care coordination.      All diagnostic data (labs/imaging) was reviewed with the patient and/or family member in the room.  All  questions were answered to their liking. The patient and/or family member voiced understanding of all instructions provided. Expectations regarding follow up and treatment plan were voiced and confirmed prior to departure. The patient was given orders/instructions at the end of the visit for reference. They were instructed to notify my office if they have not been contacted for imaging/referrals/labs/results in 1-2 weeks. They voiced understanding of all of the above.     Follow up:     There are no Patient Instructions on file for this visit.     Follow up in about 5 days (around 11/22/2022), or if symptoms worsen or fail to improve.

## 2022-11-17 NOTE — ASSESSMENT & PLAN NOTE
1. Staphylococcus aureus                                         DAVION     INTERP    COST                                         ---------  -------  ------         Vancomycin                         1         S       1            Clindamycin                      0.25        S       2            Doxycycline                      <=0.5       S       2            Gentamicin                       <=0.5       S       2            Trimet/Sulfa                     <=10        S       2            Tetracycline                      <=1        S       3            Rifampin                         <=0.5       S       4            Oxacillin                         >=4        R       2            Erythromycin                      >=8        R       3            Inducible Clindamycin Resist                NEG                   Cefoxitin Screen                            POS                    10/31/2022  ESR 34    11/7  ESR 27  CRP 0.03    11/15  ESR 37   CRP 1.51           The patient is doing well from ID standpoint. ESR and CRP are fluctuating, however, the patient could have an underlying autoimmune disorder that is undiagnosed.  I reached out to Dr. Chadwick's office, but was unable to speak to him directly regarding MRI. The patient is scheduled to undergo PT. Call placed to expedite PICC line exchange. No complications seen, however, the line is out further than I would like it to be at this point.       PLAN     1. Continue current daptomycin as planned. EOT is 11/22/2022, which is roughly 10 weeks from hospital discharge. Plan to pull picc line at that time. Case discussed w/ Dr. Amandeep Schwartz.   2. Weekly labs  3. MRI requested from Dr. Chadwick's office. Doubtful this will be of any clinical value given how early in therapy the patient is.   4. Medical record review showed sternotomy wires w/ previous chest wall abscess while in hospital. Given the patient's history of recurrent cellulitis, we would recommend chronic  suppression given the proximity of the wires to the chest wall cellulitis previously.  Plan to start Doxy 100mg bid at next visit.   5. UPDATE = spoke to Dr. Chadwick. He does not feel the MRI is accurate and feels the infection is improving from his independent review of the film.             Patient had a klebsiella UTI dating 10/25/2022 - call placed to Mario Bobby NP.  Pt was prescribed Levaquin 750 mg daily x 5 days on 11/1/22.        Culture Urine  Final                                                                SWP       Organism 1                     K pneumoniae ssp pneumoniae          Resistance Mechanism:       Extended Spectrum Beta Lactamase                                      50,000 - 75,000 CFU/mL                                      Sensitivity completed.         1. K pneumoniae ssp pneumoniae                                         DAVION     INTERP    COST                                         ---------  -------  ------         Tobramycin                        <=1        S       1            Ciprofloxacin                      1         S       2            Gentamicin                        <=1        S       2            Levofloxacin                       2         S       2            Trimet/Sulfa                     <=20        S       2            Meropenem                       <=0.25       S       3            Cefoxitin                          8         S                    Ampicillin                       >=32        R       1            Ampicillin/Sulbactam             >=32        R       1            Cefazolin                        >=64        R       1            Cefepime                         >=32        R       1            Ceftriaxone                      >=64        R       1            Nitrofurantoin                    256        R       1            Piperacillin/Tazobactam          >=128       R       2            Cefotaxime                       >=64        R       4               MULTI-DRUG RESISTANT ORGANISM       FAXED TO AVAIL       Precautions Recommended? ENHANCED CONTACT

## 2022-11-21 ENCOUNTER — TELEPHONE (OUTPATIENT)
Dept: FAMILY MEDICINE | Facility: CLINIC | Age: 67
End: 2022-11-21
Payer: MEDICARE

## 2022-11-21 NOTE — TELEPHONE ENCOUNTER
----- Message from Brigida Ledezma sent at 11/21/2022  9:14 AM CST -----  Contact: Caitlyn (wife)  Type - Call Back Request     Patient's wife Caitlyn called in to cancel out PICC removal appt due to patient is in rehab facility, and unsure if will be out in the next 2 weeks. She wants to know if [facility] can communicate w/them to see if they can do the PICC removal while he's there.     It is called [facility] and the phone # is [phone].     Please let wife know @ [phone] - thank you!

## 2022-11-22 DIAGNOSIS — M46.26 ACUTE OSTEOMYELITIS OF LUMBAR SPINE: ICD-10-CM

## 2022-11-22 DIAGNOSIS — Z79.2 CHRONIC ANTIBIOTIC SUPPRESSION: Primary | ICD-10-CM

## 2022-11-22 RX ORDER — DOXYCYCLINE HYCLATE 100 MG
100 TABLET ORAL 2 TIMES DAILY
Qty: 60 TABLET | Refills: 11 | Status: SHIPPED | OUTPATIENT
Start: 2022-11-22 | End: 2022-12-22

## 2022-11-22 NOTE — TELEPHONE ENCOUNTER
"Spoke to "Eden" at rehab.     She was advised on removal of PICC line at discharge. She was also advised on need for pt to f/u here and continue BID doxy.     Fax to [phone] attn Eden   "

## 2022-11-23 ENCOUNTER — TELEPHONE (OUTPATIENT)
Dept: FAMILY MEDICINE | Facility: CLINIC | Age: 67
End: 2022-11-23
Payer: MEDICARE

## 2022-11-30 ENCOUNTER — DOCUMENT SCAN (OUTPATIENT)
Dept: HOME HEALTH SERVICES | Facility: HOSPITAL | Age: 67
End: 2022-11-30
Payer: MEDICARE

## 2023-01-18 ENCOUNTER — TELEPHONE (OUTPATIENT)
Dept: FAMILY MEDICINE | Facility: CLINIC | Age: 68
End: 2023-01-18
Payer: MEDICARE

## 2023-01-19 NOTE — TELEPHONE ENCOUNTER
Medical record review showed sternotomy wires w/ previous chest wall abscess while in hospital. Given the patient's history of recurrent cellulitis, we would recommend chronic suppression given the proximity of the wires to the chest wall cellulitis previously.     He will be on doxycycline twice daily indefinitely bc he has hardware in his body. This is the only way we can suppress the bacteria from coming back.       We can set up a virtual visit with the patient if he is unable to appear in person. Since he is on hospice, we can try to limit visits to twice yearly.

## 2023-02-02 ENCOUNTER — OFFICE VISIT (OUTPATIENT)
Dept: INFECTIOUS DISEASES | Facility: CLINIC | Age: 68
End: 2023-02-02
Payer: MEDICARE

## 2023-02-02 DIAGNOSIS — Z79.2 CHRONIC ANTIBIOTIC SUPPRESSION: Primary | ICD-10-CM

## 2023-02-02 DIAGNOSIS — A49.02 MRSA (METHICILLIN RESISTANT STAPHYLOCOCCUS AUREUS) INFECTION: ICD-10-CM

## 2023-02-02 PROBLEM — M46.26 ACUTE OSTEOMYELITIS OF LUMBAR SPINE: Status: RESOLVED | Noted: 2022-10-06 | Resolved: 2023-02-02

## 2023-02-02 PROCEDURE — 99213 OFFICE O/P EST LOW 20 MIN: CPT | Mod: 95,GW,, | Performed by: NURSE PRACTITIONER

## 2023-02-02 PROCEDURE — 99213 PR OFFICE/OUTPT VISIT, EST, LEVL III, 20-29 MIN: ICD-10-PCS | Mod: 95,GW,, | Performed by: NURSE PRACTITIONER

## 2023-02-02 RX ORDER — DOXYCYCLINE HYCLATE 100 MG
100 TABLET ORAL 2 TIMES DAILY
Qty: 60 TABLET | Refills: 11 | Status: SHIPPED | OUTPATIENT
Start: 2023-02-02 | End: 2023-03-04

## 2023-02-02 NOTE — ASSESSMENT & PLAN NOTE
1. Staphylococcus aureus                                         DAVION     INTERP    COST                                         ---------  -------  ------         Vancomycin                         1         S       1            Clindamycin                      0.25        S       2            Doxycycline                      <=0.5       S       2            Gentamicin                       <=0.5       S       2            Trimet/Sulfa                     <=10        S       2            Tetracycline                      <=1        S       3            Rifampin                         <=0.5       S       4            Oxacillin                         >=4        R       2            Erythromycin                      >=8        R       3            Inducible Clindamycin Resist                NEG                   Cefoxitin Screen                            POS                    10/31/2022  ESR 34     11/7  ESR 27  CRP 0.03     11/15  ESR 37   CRP 1.51                     PLAN       1. Medical record review showed sternotomy wires w/ previous chest wall abscess while in hospital. Given the patient's history of recurrent cellulitis, we would recommend chronic suppression given the proximity of the wires to the chest wall cellulitis previously.   2. UPDATE = spoke to Dr. Chadwick. He does not feel the MRI is accurate and feels the infection is improving from his independent review of the film.   3. His wife voiced understanding of ABX and f/u. RTC yearly.

## 2023-02-02 NOTE — PROGRESS NOTES
Infectious Diseases Clinic Note    Subjective:       Patient ID: Josiah Wyatt is a 67 y.o. male     Chief Complaint: No chief complaint on file.        Josiah Wyatt is a 67 y.o. male here today for consultation regarding osteomyelitis.  This is a new diagnosis to me.  Referral from   .   Primary care provider is Mario Bobby NP .        The patient location is: home  The chief complaint leading to consultation is: osteomyelitis    Visit type: audiovisual    Face to Face time with patient: 15 min   15 minutes of total time spent on the encounter, which includes face to face time and non-face to face time preparing to see the patient (eg, review of tests), Obtaining and/or reviewing separately obtained history, Documenting clinical information in the electronic or other health record, Independently interpreting results (not separately reported) and communicating results to the patient/family/caregiver, or Care coordination (not separately reported).         Each patient to whom he or she provides medical services by telemedicine is:  (1) informed of the relationship between the physician and patient and the respective role of any other health care provider with respect to management of the patient; and (2) notified that he or she may decline to receive medical services by telemedicine and may withdraw from such care at any time.    Notes:         A 66-year-old  male with past medical history of HTN, DM2, SALVADOR, Aortic stenosis status post TAVR, CAD status post PCI, discitis in the L4-L5 April 2022 for which he went to [facility] and was on vancomycin for 6 weeks as per his wife, he again developed symptoms and was sent to [facility] where he was started on daptomycin for another 6 weeks and discharged to nursing facility presented to [facility] for abnormal recent MRI follow up was done which was suspicious for osteomyelitis and abscess formation L4-L5 Patient with history of TAVR, had  "negative SUPA in 4/2022 and blood cultures are negative this time which is reassuring. MRI showed L4-5 osteomyelitis/discitis with enhancing soft tissue in the anterior epidural space posterior to the L4 and L5 vertebral bodies respectively. This is significantly improved when compared to the prior exam. NSGY recommends medical management. Dr Schwartz recommends daptomycin for 8-10 weeks. Patient also had chest wall abscess drained by surgery at bedside. Cultures show MRSA. TTE negative for endocarditis and no refractory bacteremia/fevers.        He was discharged home in stable condition on 9/13/2022 with home health services.  He is now on Hospice care.     Here for follow up regarding chronic antibiotic suppression.  He has a history of recurrent MRSA as noted above.  He is accompanied by his wife.   Doing well at this time. Denies fever, chills, NVD.  She tells me that he has been off Doxy for 2 months. No recurrences, but she would feel better with chronic suppression. Unable to appear in person due to the progression of his chronic diseases. No acute issues reported.         No new complaints at this time.              Past Medical History:   Diagnosis Date    Diabetes mellitus     Sepsis, unspecified organism     Staphylococcus aureus bacteremia     Stenosis of aortic and mitral valves        Social History     Socioeconomic History    Marital status:    Tobacco Use    Smoking status: Never    Smokeless tobacco: Never         Current Outpatient Medications:     amiodarone (PACERONE) 200 MG Tab, Take 200 mg by mouth once daily., Disp: , Rfl:     aspirin (ECOTRIN) 81 MG EC tablet, Take 81 mg by mouth once daily., Disp: , Rfl:     BASAGLAR KWIKPEN U-100 INSULIN glargine 100 units/mL SubQ pen, Inject 24 Units into the skin once daily., Disp: , Rfl:     BD ANGEL 2ND GEN PEN NEEDLE 32 gauge x 5/32" Ndle, , Disp: , Rfl:     clopidogreL (PLAVIX) 75 mg tablet, Take 75 mg by mouth once daily., Disp: , Rfl:     " doxycycline (VIBRA-TABS) 100 MG tablet, Take 1 tablet (100 mg total) by mouth 2 (two) times daily., Disp: 60 tablet, Rfl: 11    DULoxetine (CYMBALTA) 60 MG capsule, , Disp: , Rfl:     empagliflozin (JARDIANCE) 25 mg tablet, , Disp: , Rfl:     ezetimibe (ZETIA) 10 mg tablet, Take 10 mg by mouth once daily., Disp: , Rfl:     famotidine (PEPCID) 20 MG tablet, Take 20 mg by mouth 2 (two) times daily., Disp: , Rfl:     furosemide (LASIX) 40 MG tablet, Take 40 mg by mouth once daily., Disp: , Rfl:     furosemide (LASIX) 80 MG tablet, Take 80 mg by mouth once daily., Disp: , Rfl:     gabapentin (NEURONTIN) 100 MG capsule, , Disp: , Rfl:     insulin lispro 100 unit/mL injection, INJECT PER SLIDING SCALE SUBCUTANEOUSLY NO MORE THAN 30 UNITS DAILY, Disp: , Rfl:     levoFLOXacin (LEVAQUIN) 750 MG tablet, Take 750 mg by mouth once daily., Disp: , Rfl:     levothyroxine (SYNTHROID) 100 MCG tablet, , Disp: , Rfl:     memantine (NAMENDA) 5 MG Tab, , Disp: , Rfl:     metoprolol tartrate (LOPRESSOR) 50 MG tablet, Take 50 mg by mouth 2 (two) times daily., Disp: , Rfl:     mupirocin (BACTROBAN) 2 % ointment, Apply 22 g topically once daily., Disp: , Rfl:     risperiDONE (RISPERDAL) 0.5 MG Tab, Take 0.5 mg by mouth 2 (two) times daily., Disp: , Rfl:     rosuvastatin (CRESTOR) 20 MG tablet, , Disp: , Rfl:     tamsulosin (FLOMAX) 0.4 mg Cap, Take 1 capsule by mouth once daily., Disp: , Rfl:     traMADoL (ULTRAM) 50 mg tablet, Take 1 tablet (50 mg total) by mouth every 4 (four) hours as needed for Pain., Disp: 30 tablet, Rfl: 0    Review of Systems   Constitutional:  Negative for chills, diaphoresis and fever.   Respiratory:  Negative for shortness of breath.    Gastrointestinal:  Negative for diarrhea and vomiting.   Skin:  Negative for wound.         Objective:      There were no vitals filed for this visit.  Physical Exam  Nursing note reviewed.   Constitutional:       General: He is not in acute distress.     Appearance: He is  well-developed. He is not ill-appearing or toxic-appearing.      Comments: Physical exam limited by telemedicine visit   Neurological:      Mental Status: He is alert and oriented to person, place, and time. Mental status is at baseline.   Psychiatric:         Mood and Affect: Mood normal.         Speech: Speech normal.         Behavior: Behavior normal. Behavior is cooperative.         Thought Content: Thought content normal.         Judgment: Judgment normal.           Assessment/Plan:       1. Chronic antibiotic suppression    2. MRSA (methicillin resistant Staphylococcus aureus) infection      Problem List Items Addressed This Visit          ID    MRSA (methicillin resistant Staphylococcus aureus) infection    Current Assessment & Plan           1. Staphylococcus aureus                                         DAVION     INTERP    COST                                         ---------  -------  ------         Vancomycin                         1         S       1            Clindamycin                      0.25        S       2            Doxycycline                      <=0.5       S       2            Gentamicin                       <=0.5       S       2            Trimet/Sulfa                     <=10        S       2            Tetracycline                      <=1        S       3            Rifampin                         <=0.5       S       4            Oxacillin                         >=4        R       2            Erythromycin                      >=8        R       3            Inducible Clindamycin Resist                NEG                   Cefoxitin Screen                            POS                    10/31/2022  ESR 34     11/7  ESR 27  CRP 0.03     11/15  ESR 37   CRP 1.51                     PLAN       1. Medical record review showed sternotomy wires w/ previous chest wall abscess while in hospital. Given the patient's history of recurrent cellulitis, we would recommend chronic suppression  given the proximity of the wires to the chest wall cellulitis previously.   2. UPDATE = spoke to Dr. Chadwick. He does not feel the MRI is accurate and feels the infection is improving from his independent review of the film.   3. His wife voiced understanding of ABX and f/u. RTC yearly.               Relevant Medications    doxycycline (VIBRA-TABS) 100 MG tablet    Chronic antibiotic suppression - Primary    Current Assessment & Plan     Doxy bid indefinitely.  He will follow-up yearly given his disease progression and his hospice status.          Relevant Medications    doxycycline (VIBRA-TABS) 100 MG tablet      No visits with results within 1 Month(s) from this visit.   Latest known visit with results is:   No results found for any previous visit.      No results found in the last 30 days.      Duration of encounter: 15 minutes  This includes face-to-face time and non face-to-face time preparing to see the patient (eg, review of tests), obtaining and/or reviewing separately obtained history, documenting clinical information in the electronic or other health record, independently interpreting resultsand communicating results to the patient/family/caregiver, or care coordination.      All diagnostic data (labs/imaging) was reviewed with the patient and/or family member in the room.  All questions were answered to their liking. The patient and/or family member voiced understanding of all instructions provided. Expectations regarding follow up and treatment plan were voiced and confirmed prior to departure. The patient was given orders/instructions at the end of the visit for reference. They were instructed to notify my office if they have not been contacted for imaging/referrals/labs/results in 1-2 weeks. They voiced understanding of all of the above.     Follow up:     There are no Patient Instructions on file for this visit.     Follow up in about 1 year (around 2/2/2024), or if symptoms worsen or fail to improve.

## 2023-02-02 NOTE — ASSESSMENT & PLAN NOTE
Doxy bid indefinitely.  He will follow-up yearly given his disease progression and his hospice status.